# Patient Record
Sex: FEMALE | Race: BLACK OR AFRICAN AMERICAN | NOT HISPANIC OR LATINO | URBAN - METROPOLITAN AREA
[De-identification: names, ages, dates, MRNs, and addresses within clinical notes are randomized per-mention and may not be internally consistent; named-entity substitution may affect disease eponyms.]

---
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
## 2022-03-31 ENCOUNTER — EMERGENCY (EMERGENCY)
Facility: HOSPITAL | Age: 22
LOS: 1 days | Discharge: HOME | End: 2022-03-31
Admitting: EMERGENCY MEDICINE
Payer: COMMERCIAL

## 2022-03-31 VITALS
HEIGHT: 65 IN | WEIGHT: 139.99 LBS | SYSTOLIC BLOOD PRESSURE: 117 MMHG | TEMPERATURE: 100 F | HEART RATE: 89 BPM | DIASTOLIC BLOOD PRESSURE: 83 MMHG | OXYGEN SATURATION: 98 % | RESPIRATION RATE: 20 BRPM

## 2022-03-31 DIAGNOSIS — F32.A DEPRESSION, UNSPECIFIED: ICD-10-CM

## 2022-03-31 DIAGNOSIS — F41.9 ANXIETY DISORDER, UNSPECIFIED: ICD-10-CM

## 2022-03-31 DIAGNOSIS — Z91.018 ALLERGY TO OTHER FOODS: ICD-10-CM

## 2022-03-31 DIAGNOSIS — Z86.19 PERSONAL HISTORY OF OTHER INFECTIOUS AND PARASITIC DISEASES: ICD-10-CM

## 2022-03-31 DIAGNOSIS — R13.10 DYSPHAGIA, UNSPECIFIED: ICD-10-CM

## 2022-03-31 DIAGNOSIS — Z91.048 OTHER NONMEDICINAL SUBSTANCE ALLERGY STATUS: ICD-10-CM

## 2022-03-31 LAB
ANION GAP SERPL CALC-SCNC: 14 MMOL/L — SIGNIFICANT CHANGE UP (ref 5–17)
BUN SERPL-MCNC: 7 MG/DL — SIGNIFICANT CHANGE UP (ref 7–23)
CALCIUM SERPL-MCNC: 8.5 MG/DL — SIGNIFICANT CHANGE UP (ref 8.4–10.5)
CHLORIDE SERPL-SCNC: 106 MMOL/L — SIGNIFICANT CHANGE UP (ref 96–108)
CO2 SERPL-SCNC: 18 MMOL/L — LOW (ref 22–31)
CREAT SERPL-MCNC: 0.88 MG/DL — SIGNIFICANT CHANGE UP (ref 0.5–1.3)
EGFR: 96 ML/MIN/1.73M2 — SIGNIFICANT CHANGE UP
GLUCOSE SERPL-MCNC: 81 MG/DL — SIGNIFICANT CHANGE UP (ref 70–99)
HCG SERPL-ACNC: <0 MIU/ML — SIGNIFICANT CHANGE UP
HCT VFR BLD CALC: 34.8 % — SIGNIFICANT CHANGE UP (ref 34.5–45)
HGB BLD-MCNC: 11.8 G/DL — SIGNIFICANT CHANGE UP (ref 11.5–15.5)
MCHC RBC-ENTMCNC: 33.6 PG — SIGNIFICANT CHANGE UP (ref 27–34)
MCHC RBC-ENTMCNC: 33.9 GM/DL — SIGNIFICANT CHANGE UP (ref 32–36)
MCV RBC AUTO: 99.1 FL — SIGNIFICANT CHANGE UP (ref 80–100)
NRBC # BLD: 0 /100 WBCS — SIGNIFICANT CHANGE UP (ref 0–0)
PLATELET # BLD AUTO: 154 K/UL — SIGNIFICANT CHANGE UP (ref 150–400)
POTASSIUM SERPL-MCNC: 3.7 MMOL/L — SIGNIFICANT CHANGE UP (ref 3.5–5.3)
POTASSIUM SERPL-SCNC: 3.7 MMOL/L — SIGNIFICANT CHANGE UP (ref 3.5–5.3)
RBC # BLD: 3.51 M/UL — LOW (ref 3.8–5.2)
RBC # FLD: 13 % — SIGNIFICANT CHANGE UP (ref 10.3–14.5)
SODIUM SERPL-SCNC: 138 MMOL/L — SIGNIFICANT CHANGE UP (ref 135–145)
WBC # BLD: 12.7 K/UL — HIGH (ref 3.8–10.5)
WBC # FLD AUTO: 12.7 K/UL — HIGH (ref 3.8–10.5)

## 2022-03-31 PROCEDURE — 84702 CHORIONIC GONADOTROPIN TEST: CPT

## 2022-03-31 PROCEDURE — 96372 THER/PROPH/DIAG INJ SC/IM: CPT | Mod: XU

## 2022-03-31 PROCEDURE — 99285 EMERGENCY DEPT VISIT HI MDM: CPT

## 2022-03-31 PROCEDURE — 36415 COLL VENOUS BLD VENIPUNCTURE: CPT

## 2022-03-31 PROCEDURE — 70490 CT SOFT TISSUE NECK W/O DYE: CPT | Mod: 26,MA

## 2022-03-31 PROCEDURE — 96374 THER/PROPH/DIAG INJ IV PUSH: CPT

## 2022-03-31 PROCEDURE — 70490 CT SOFT TISSUE NECK W/O DYE: CPT | Mod: MA

## 2022-03-31 PROCEDURE — 96375 TX/PRO/DX INJ NEW DRUG ADDON: CPT

## 2022-03-31 PROCEDURE — 99284 EMERGENCY DEPT VISIT MOD MDM: CPT | Mod: 25

## 2022-03-31 PROCEDURE — 85027 COMPLETE CBC AUTOMATED: CPT

## 2022-03-31 PROCEDURE — 80048 BASIC METABOLIC PNL TOTAL CA: CPT

## 2022-03-31 RX ORDER — ACETAMINOPHEN 500 MG
1000 TABLET ORAL ONCE
Refills: 0 | Status: COMPLETED | OUTPATIENT
Start: 2022-03-31 | End: 2022-03-31

## 2022-03-31 RX ORDER — PENICILLIN G BENZATHINE 1200000 [IU]/2ML
1.2 INJECTION, SUSPENSION INTRAMUSCULAR ONCE
Refills: 0 | Status: COMPLETED | OUTPATIENT
Start: 2022-03-31 | End: 2022-03-31

## 2022-03-31 RX ORDER — KETOROLAC TROMETHAMINE 30 MG/ML
15 SYRINGE (ML) INJECTION ONCE
Refills: 0 | Status: DISCONTINUED | OUTPATIENT
Start: 2022-03-31 | End: 2022-03-31

## 2022-03-31 RX ORDER — SODIUM CHLORIDE 9 MG/ML
1000 INJECTION INTRAMUSCULAR; INTRAVENOUS; SUBCUTANEOUS ONCE
Refills: 0 | Status: COMPLETED | OUTPATIENT
Start: 2022-03-31 | End: 2022-03-31

## 2022-03-31 RX ORDER — DEXAMETHASONE 0.5 MG/5ML
10 ELIXIR ORAL ONCE
Refills: 0 | Status: COMPLETED | OUTPATIENT
Start: 2022-03-31 | End: 2022-03-31

## 2022-03-31 RX ORDER — DEXAMETHASONE 0.5 MG/5ML
10 ELIXIR ORAL ONCE
Refills: 0 | Status: DISCONTINUED | OUTPATIENT
Start: 2022-03-31 | End: 2022-03-31

## 2022-03-31 RX ADMIN — Medication 1000 MILLIGRAM(S): at 21:29

## 2022-03-31 RX ADMIN — SODIUM CHLORIDE 1000 MILLILITER(S): 9 INJECTION INTRAMUSCULAR; INTRAVENOUS; SUBCUTANEOUS at 21:28

## 2022-03-31 RX ADMIN — SODIUM CHLORIDE 1000 MILLILITER(S): 9 INJECTION INTRAMUSCULAR; INTRAVENOUS; SUBCUTANEOUS at 19:42

## 2022-03-31 RX ADMIN — Medication 15 MILLIGRAM(S): at 20:31

## 2022-03-31 RX ADMIN — PENICILLIN G BENZATHINE 1.2 MILLION UNIT(S): 1200000 INJECTION, SUSPENSION INTRAMUSCULAR at 20:31

## 2022-03-31 RX ADMIN — Medication 102 MILLIGRAM(S): at 20:30

## 2022-03-31 NOTE — ED ADULT TRIAGE NOTE - CHIEF COMPLAINT QUOTE
Pt presents to ED C/O throat swelling, decreased appetite and trouble swallowing x 3 days. Dx FLU+, Strep+. Pt on Tamiflu at home. Pt Mom at bedside. Protecting airway. Hx of " mild asthma, nut and aaron allergies".

## 2022-03-31 NOTE — ED PROVIDER NOTE - CLINICAL SUMMARY MEDICAL DECISION MAKING FREE TEXT BOX
Pt with strept throat, flu, appears dehydrated  will give fluids, decadron  will treat with penicillin g   will reassess Pt with strept throat, flu, appears dehydrated  will give fluids, decadron  will treat with penicillin g   will reassess  ct neck wnl, labs show slightly elveated wbc of 12 , pt felt better after fluids

## 2022-03-31 NOTE — ED PROVIDER NOTE - OBJECTIVE STATEMENT
22 year old female no major med problems, recently dx with flu and strept throat.   Pt is taking augmentin and tamiflu for two days. Pt states she still has throat pain, and feels dehydrated.  It is also hard for aiden to swollow pills but she is able to swallow them, no voice changes.

## 2022-03-31 NOTE — ED ADULT TRIAGE NOTE - PATIENT ON (OXYGEN DELIVERY METHOD)
How Many Skin Cancers Have You Had?: one
What Is The Reason For Today's Visit?: History of Non-Melanoma Skin Cancer
When Was Your Last Cancer Diagnosed?: 7/2015
room air

## 2022-03-31 NOTE — ED PROVIDER NOTE - ENMT, MLM
Airway patent, Nasal mucosa clear. Mouth with normal mucosa. Throat has no vesicles, no oropharyngeal exudates and uvula is midline.  tonsillar erythema noted and white exudates  no voice changes

## 2022-03-31 NOTE — ED ADULT NURSE NOTE - OBJECTIVE STATEMENT
22years female alert mental state (AOX3) received on foot.  -complain of  throat swelling, weakness, loss of appetite three dyas ago.   Hx of asthma. pt got Flu and strep positive and pt took Tamiflu.    -denied chest pain, SOB, abdomen pain, n/v/d.  Pt is in the bed comfortably at this time. Will continue to monitor and document any changes.

## 2022-03-31 NOTE — ED PROVIDER NOTE - PROGRESS NOTE DETAILS
Upon reassessment mom stated she is not sure if her voice is changed, will get ct neck rule out abscess    will sign pt out to follow up labs and ct scan much more comfortable, voice is clear, eating some jello

## 2022-03-31 NOTE — ED ADULT NURSE NOTE - CAS TRG GENERAL AIRWAY, MLM
The first balloon was inserted into the circumflex and marginal circumflex.Max pressure = 6 cory. Total duration = 12 seconds.       Patent

## 2022-03-31 NOTE — ED PROVIDER NOTE - PATIENT PORTAL LINK FT
You can access the FollowMyHealth Patient Portal offered by [facility] by registering at the following website: [email or web address]. By joining [facility]’s FollowMyHealth portal, you will also be able to view your health information using other applications (apps) compatible with our system.

## 2022-03-31 NOTE — ED PROVIDER NOTE - NSFOLLOWUPINSTRUCTIONS_ED_ALL_ED_FT
Follow up with your ENT on Monday if you are not better  Return to ER if worsening      Tonsillitis    WHAT YOU NEED TO KNOW:    Tonsillitis is inflammation of your tonsils. Tonsils are the lumps of tissue on both sides of the back of your throat. Tonsils are part of your immune system. They help you fight infections. Recurrent tonsillitis is when you have tonsillitis many times in 1 year. Chronic tonsillitis is when you have a sore throat that lasts 3 months or longer.   Mouth Anatomy         DISCHARGE INSTRUCTIONS:    Medicines: You may need any of the following:   •Acetaminophen decreases pain and fever. It is available without a doctor's order. Ask how much to take and how often to take it. Follow directions. Acetaminophen can cause liver damage if not taken correctly.      •NSAIDs, such as ibuprofen, help decrease swelling, pain, and fever. This medicine is available with or without a doctor's order. NSAIDs can cause stomach bleeding or kidney problems in certain people. If you take blood thinner medicine, always ask your healthcare provider if NSAIDs are safe for you. Always read the medicine label and follow directions.      •Antibiotics help treat a bacterial infection.      •Take your medicine as directed. Contact your healthcare provider if you think your medicine is not helping or if you have side effects. Tell him or her if you are allergic to any medicine. Keep a list of the medicines, vitamins, and herbs you take. Include the amounts, and when and why you take them. Bring the list or the pill bottles to follow-up visits. Carry your medicine list with you in case of an emergency.      Call 911 for the following:   •You have trouble breathing because your tonsils are swollen.          Contact your healthcare provider if:   •You have a fever.      •Your pain gets worse or does not get better after you take pain medicine.      •Your sore throat is not better after you have finished antibiotic treatment.      •You have trouble sleeping and wake up trying to catch your breath.      •You have questions or concerns about your condition or care.      Rest when you feel it is needed. Slowly start to do more each day. Return to your daily activities as directed.     Drink liquids as directed: You may need to drink more liquid than usual to help prevent dehydration. Ask how much liquid to drink each day and which liquids are best for you.     Gargle with warm salt water: This may help decrease throat pain. Mix 1 teaspoon of salt in 8 ounces of warm water. Ask how often you should do this.    Prevent the spread of germs: Wash your hands often. Do not share food or drinks with anyone. You may be able to return to work when you feel better and your fever is gone for at least 24 hours.    Follow up with your doctor as directed: Write down your questions so you remember to ask them during your visits.        © Copyright [employer] 2022           back to top                          © Copyright [employer] 2022

## 2022-04-01 PROBLEM — F32.9 MAJOR DEPRESSIVE DISORDER, SINGLE EPISODE, UNSPECIFIED: Chronic | Status: ACTIVE | Noted: 2017-08-05

## 2022-04-01 PROBLEM — F41.9 ANXIETY DISORDER, UNSPECIFIED: Chronic | Status: ACTIVE | Noted: 2017-08-05

## 2023-03-29 NOTE — ED PEDIATRIC NURSE NOTE - ISOLATION TYPE:
TW called pt 10min following start of appt as pt was not in attendance. TW sent link for appt via epic at 11:01 and 11:05, and LVM encouraging pt to attend appt as provider would wait on appt link until 11:15. If unable to attend appt patient provided office number and encouraged to call.    None

## 2024-05-20 NOTE — ED PROVIDER NOTE - ENDOCRINE NEGATIVE STATEMENT, MLM
Patient here for f/u appt. For prostate cancer.  Patient denies pain.  AUA score 3.  Last PSA lab 5/14/24 - 0.13.  Post prostatectomy 9/28/2021.  Does wear incontinence pad - states urine dribbling when he drinks alcohol.     no diabetes and no thyroid trouble.